# Patient Record
Sex: MALE | Race: WHITE | NOT HISPANIC OR LATINO | Employment: OTHER | URBAN - METROPOLITAN AREA
[De-identification: names, ages, dates, MRNs, and addresses within clinical notes are randomized per-mention and may not be internally consistent; named-entity substitution may affect disease eponyms.]

---

## 2017-06-28 ENCOUNTER — PROCEDURE VISIT (OUTPATIENT)
Dept: ALLERGY | Facility: CLINIC | Age: 67
End: 2017-06-28
Payer: MEDICARE

## 2017-06-28 VITALS
HEIGHT: 70 IN | BODY MASS INDEX: 22.9 KG/M2 | SYSTOLIC BLOOD PRESSURE: 110 MMHG | WEIGHT: 160 LBS | DIASTOLIC BLOOD PRESSURE: 68 MMHG

## 2017-06-28 DIAGNOSIS — T78.1XXD ADVERSE FOOD REACTION, SUBSEQUENT ENCOUNTER: Primary | ICD-10-CM

## 2017-06-28 DIAGNOSIS — F41.9 ANXIETY: ICD-10-CM

## 2017-06-28 DIAGNOSIS — T63.91XA VENOM-INDUCED ANAPHYLAXIS, ACCIDENTAL OR UNINTENTIONAL, INITIAL ENCOUNTER: ICD-10-CM

## 2017-06-28 PROBLEM — T78.1XXA ADVERSE FOOD REACTION: Status: ACTIVE | Noted: 2017-06-28

## 2017-06-28 PROCEDURE — 95004 PERQ TESTS W/ALRGNC XTRCS: CPT | Mod: ,,, | Performed by: ALLERGY & IMMUNOLOGY

## 2017-06-28 NOTE — PROCEDURES
Procedures     Pt presents for food reactions.     He mentions corn, wheat, beef, and wants to know if other foods contribute.     Discussed him to bring corn or cornstarch in clinic for a prick to prick and will have to test for corn, gluten, an venoms via serum IgE.     quintip device was used for food allergens made by [employer]. Allergens were placed on the volar surface of the forearm.  Adequate histamine and saline control was used with ld tip.  Pertinent positives: negative.     [facility] venom, corn, and gluten.

## 2017-06-29 LAB
DEPRECATED PAPER WASP IGE RAST QL: ABNORMAL
PAPER WASP IGE QN: 0.11 KU/L
REF LAB TEST REF RANGE: NORMAL

## 2017-06-30 LAB
CORN IGE QN: <0.1 KU/L
DEPRECATED CORN IGE RAST QL: 0
DEPRECATED GLUTEN IGE RAST QL: 0
DEPRECATED HONEY BEE IGE RAST QL: 2
DEPRECATED RED IMP FIRE ANT IGE RAST QL: 0
DEPRECATED WHITEFACED HORNET IGE RAST QL: 0
DEPRECATED YELLOW HORNET IGE RAST QL: 0
DEPRECATED YELLOW JACKET IGE RAST QL: 0
GLUTEN IGE QN: <0.1 KU/L
HONEY BEE IGE QN: 0.71 KU/L
RED IMP FIRE ANT IGE QN: <0.1 KU/L
REF LAB TEST REF RANGE: NORMAL
WHITEFACED HORNET IGE QN: <0.1 KU/L
YELLOW HORNET IGE QN: <0.1 KU/L
YELLOW JACKET IGE QN: <0.1 KU/L

## 2017-06-30 NOTE — PROGRESS NOTES
Will you call the patient and tell him that his honey bee lab shows sensitivity and his corn and gluten show negative tests. We are still waiting on wasp from [facility] to confirm allergy to wasp. I recommend an epi pen for emergencies if one is not already available to you.

## 2017-07-03 ENCOUNTER — TELEPHONE (OUTPATIENT)
Dept: PULMONOLOGY | Facility: CLINIC | Age: 67
End: 2017-07-03

## 2017-07-03 DIAGNOSIS — T63.91XA ALLERGY OR TOXIC REACTION TO VENOM, ACCIDENTAL OR UNINTENTIONAL, INITIAL ENCOUNTER: Primary | ICD-10-CM

## 2017-07-03 RX ORDER — EPINEPHRINE 0.3 MG/.3ML
1 INJECTION SUBCUTANEOUS ONCE
Qty: 0.3 ML | Refills: 3 | Status: SHIPPED | OUTPATIENT
Start: 2017-07-03 | End: 2017-07-20

## 2017-07-05 NOTE — TELEPHONE ENCOUNTER
You can tell him I am uncertain as to why he has this reaction when he eats it. It is not a reaction that is widely studied in corn starch or corn products. For now, he may need to avoid it to help his symptoms.

## 2017-07-20 ENCOUNTER — OFFICE VISIT (OUTPATIENT)
Dept: ALLERGY | Facility: CLINIC | Age: 67
End: 2017-07-20
Payer: MEDICARE

## 2017-07-20 ENCOUNTER — LAB VISIT (OUTPATIENT)
Dept: LAB | Facility: HOSPITAL | Age: 67
End: 2017-07-20
Payer: MEDICARE

## 2017-07-20 VITALS
BODY MASS INDEX: 22.56 KG/M2 | WEIGHT: 161.19 LBS | HEIGHT: 71 IN | DIASTOLIC BLOOD PRESSURE: 60 MMHG | SYSTOLIC BLOOD PRESSURE: 110 MMHG | HEART RATE: 56 BPM

## 2017-07-20 DIAGNOSIS — J31.0 CHRONIC RHINITIS, UNSPECIFIED TYPE: Primary | ICD-10-CM

## 2017-07-20 DIAGNOSIS — J31.0 CHRONIC RHINITIS, UNSPECIFIED TYPE: ICD-10-CM

## 2017-07-20 DIAGNOSIS — Z91.018 FOOD ALLERGY: ICD-10-CM

## 2017-07-20 LAB — IGA SERPL-MCNC: 123 MG/DL

## 2017-07-20 PROCEDURE — 86003 ALLG SPEC IGE CRUDE XTRC EA: CPT | Mod: 59

## 2017-07-20 PROCEDURE — 82784 ASSAY IGA/IGD/IGG/IGM EACH: CPT

## 2017-07-20 PROCEDURE — 86003 ALLG SPEC IGE CRUDE XTRC EA: CPT

## 2017-07-20 PROCEDURE — 86255 FLUORESCENT ANTIBODY SCREEN: CPT

## 2017-07-20 PROCEDURE — 99204 OFFICE O/P NEW MOD 45 MIN: CPT | Mod: S$GLB,,, | Performed by: ALLERGY & IMMUNOLOGY

## 2017-07-20 PROCEDURE — 99999 PR PBB SHADOW E&M-NEW PATIENT-LVL III: CPT | Mod: PBBFAC,,, | Performed by: ALLERGY & IMMUNOLOGY

## 2017-07-20 PROCEDURE — 83516 IMMUNOASSAY NONANTIBODY: CPT
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
## 2017-07-24 LAB
ALLERGEN RYE IGE: <0.35 KU/L
ALLERGEN WHEAT IGE: <0.35 KU/L
CORN IGE QN: <0.35 KU/L
DEPRECATED CORN IGE RAST QL: NORMAL
DEPRECATED GLUTEN IGE RAST QL: NORMAL
DEPRECATED OAT IGE RAST QL: NORMAL
DEPRECATED RICE IGE RAST QL: NORMAL
DEPRECATED SOYBEAN IGE RAST QL: NORMAL
GLUTEN IGE QN: <0.35 KU/L
OAT IGE QN: <0.35 KU/L
RICE IGE QN: <0.35 KU/L
RYE CLASS: NORMAL
SOYBEAN IGE QN: <0.35 KU/L
TTG IGA SER IA-ACNC: 3 UNITS
WHEAT CLASS: NORMAL

## 2017-07-27 LAB
ENDOMYSIAL (EMA) ANTIBODY IGG TITER: NORMAL
ENDOMYSIAL (EMA) ANTIBODY IGG: NORMAL TITER

## 2017-08-02 ENCOUNTER — TELEPHONE (OUTPATIENT)
Dept: ALLERGY | Facility: CLINIC | Age: 67
End: 2017-08-02

## 2017-08-02 NOTE — TELEPHONE ENCOUNTER
"Spoke to pt, told him tests were all negative. Pt states he ate some malted barley and also had similar symptoms. I explained that he may have an intolerance. Pt said Dr Snyder also told him something to that effect.  Pt would like to have test for barley just so he "knows".     Told pt I would let Dr Snyder know that we spoke and what his concerns are.   "

## 2017-08-21 ENCOUNTER — TELEPHONE (OUTPATIENT)
Dept: ALLERGY | Facility: CLINIC | Age: 67
End: 2017-08-21

## 2017-08-21 NOTE — TELEPHONE ENCOUNTER
Called to inform them that we have not received a Release of Info for this patient.  They will send it again.  Verified fax number, which was correct.  Will send to my attention.

## 2017-08-21 NOTE — TELEPHONE ENCOUNTER
----- Message from Yanely Morris MA sent at 8/15/2017  2:33 PM CDT -----  Contact: [facility]vino[facility] - [phone] fax # [phone]  Requesting Office Visit Notes and Allergy Test Results from 7/20/17. Will fax release to office. Please call. Thanks!

## 2018-10-10 ENCOUNTER — OFFICE VISIT (OUTPATIENT)
Dept: ORTHOPEDICS | Facility: CLINIC | Age: 68
End: 2018-10-10
Payer: MEDICARE

## 2018-10-10 VITALS — HEIGHT: 71 IN | BODY MASS INDEX: 24.45 KG/M2 | WEIGHT: 174.63 LBS

## 2018-10-10 DIAGNOSIS — M19.171 POST-TRAUMATIC ARTHRITIS OF ANKLE, RIGHT: Primary | ICD-10-CM

## 2018-10-10 PROCEDURE — 1101F PT FALLS ASSESS-DOCD LE1/YR: CPT | Mod: ,,, | Performed by: ORTHOPAEDIC SURGERY

## 2018-10-10 PROCEDURE — 73610 X-RAY EXAM OF ANKLE: CPT | Mod: FY,RT,, | Performed by: ORTHOPAEDIC SURGERY

## 2018-10-10 PROCEDURE — 73630 X-RAY EXAM OF FOOT: CPT | Mod: FY,RT,, | Performed by: ORTHOPAEDIC SURGERY

## 2018-10-10 PROCEDURE — 99204 OFFICE O/P NEW MOD 45 MIN: CPT | Mod: ,,, | Performed by: ORTHOPAEDIC SURGERY

## 2018-10-10 NOTE — PROGRESS NOTES
Subjective:       Chief Complaint    Chief Complaint   Patient presents with    Right Ankle - Pain     Pt reports today for Rt ankle pain x 1 month. Rt leg 2 inches short. Pt states that he was resting for 2-3 months recovering from back pain. He states that he started moving around and startes having ankle pain. He states that he feels soreness in Rt leg       HPI  Harjinder Fairchild is a 68 y.o.  male who presents follow-up on posttraumatic arthritis foot and ankle, right. Recently started having problems that have been improving.      Past History  Past Medical History:   Diagnosis Date    Adverse food reaction     Allergy     Anxiety     Back pain      Past Surgical History:   Procedure Laterality Date    FRACTURE SURGERY      right foot    FRACTURE SURGERY  1986    Jaw, Nose    INJECTION-STEROID-EPIDURAL-TRANSFORAMINAL Left 4/17/2014    [facility]    INJECTION-STEROID-EPIDURAL-TRANSFORAMINAL Left 3/27/2014    [facility]     Social History     Socioeconomic History    Marital status:      Spouse name: Not on file    Number of children: Not on file    Years of education: Not on file    Highest education level: Not on file   Social Needs    Financial resource strain: Not on file    Food insecurity - worry: Not on file    Food insecurity - inability: Not on file    Transportation needs - medical: Not on file    Transportation needs - non-medical: Not on file   Occupational History    Not on file   Tobacco Use    Smoking status: Never Smoker    Smokeless tobacco: Never Used   Substance and Sexual Activity    Alcohol use: Yes     Comment: socially maybe 2 on weekend.    Drug use: No    Sexual activity: Not on file   Other Topics Concern    Not on file   Social History Narrative    Not on file         Medications  Current Outpatient Medications   Medication Sig    ibuprofen (ADVIL,MOTRIN) 200 MG tablet Take 600 mg by  mouth every 6 (six) hours as needed.       No current facility-administered medications for this visit.      Facility-Administered Medications Ordered in Other Visits   Medication    0.9%  NaCl infusion    betamethasone acetate-betamethasone sodium phosphate injection    bupivacaine (PF) 0.25% (2.5 mg/ml) injection    gadodiamide Soln       Allergies  Review of patient's allergies indicates:   Allergen Reactions    Shellfish containing products Other (See Comments)     Anxiety, headache, itching.  Topical Betadine is OK.    Iodine containing multivitamin Itching    Unable to assess      Msg, perservatives.         Review of Systems     Constitutional: Negative    HENT: Negative  Eyes: Negative  Respiratory: Negative  Cardiovascular: Negative  Musculoskeletal: HPI  Skin: Negative  Neurological: Negative  Hematological: Negative  Endocrine: Negative      Physical Exam    There were no vitals filed for this visit.  Physical Examination: A jog of motion is present in the ankle region with some tenderness at the site of motion. Foot is moderately pronated. There is no inversion, eversion at the hindfoot. He has a good dorsalis pedis. He can dorsiflex to neutral and plantar flexion may be 20°.     Skin-clear  General appearance -  well appearing, and in no distress  Mental status - awake  Neck - supple  Chest -  symmetric air entry  Heart - normal rate   Abdomen - soft      Assessment/Plan   Post-traumatic arthritis of ankle, right  -     X-Ray Ankle Complete Right  -     X-Ray Foot Complete Right      . He does have limited motion in the ankle joint. Getting impingement in dorsiflexion, which is very limited.      This note was dictated using voice recognition software and may contain grammatical errors.

## 2018-12-11 ENCOUNTER — OFFICE VISIT (OUTPATIENT)
Dept: ORTHOPEDICS | Facility: CLINIC | Age: 68
End: 2018-12-11
Payer: MEDICARE

## 2018-12-11 VITALS
HEIGHT: 71 IN | SYSTOLIC BLOOD PRESSURE: 122 MMHG | HEART RATE: 59 BPM | BODY MASS INDEX: 24.36 KG/M2 | WEIGHT: 174 LBS | DIASTOLIC BLOOD PRESSURE: 59 MMHG

## 2018-12-11 DIAGNOSIS — M54.16 LUMBAR RADICULITIS: ICD-10-CM

## 2018-12-11 DIAGNOSIS — M19.171 POST-TRAUMATIC ARTHRITIS OF ANKLE, RIGHT: Primary | ICD-10-CM

## 2018-12-11 PROCEDURE — 73610 X-RAY EXAM OF ANKLE: CPT | Mod: RT,,, | Performed by: ORTHOPAEDIC SURGERY

## 2018-12-11 PROCEDURE — 72100 X-RAY EXAM L-S SPINE 2/3 VWS: CPT | Mod: ,,, | Performed by: ORTHOPAEDIC SURGERY

## 2018-12-11 PROCEDURE — 99213 OFFICE O/P EST LOW 20 MIN: CPT | Mod: 25,57,, | Performed by: ORTHOPAEDIC SURGERY

## 2018-12-11 PROCEDURE — 1101F PT FALLS ASSESS-DOCD LE1/YR: CPT | Mod: ,,, | Performed by: ORTHOPAEDIC SURGERY

## 2018-12-11 RX ORDER — IBUPROFEN 200 MG
TABLET ORAL EVERY 6 HOURS PRN
COMMUNITY
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
## 2018-12-12 DIAGNOSIS — M25.571 RIGHT ANKLE PAIN, UNSPECIFIED CHRONICITY: Primary | ICD-10-CM

## 2018-12-13 ENCOUNTER — OFFICE VISIT (OUTPATIENT)
Dept: ORTHOPEDICS | Facility: CLINIC | Age: 68
End: 2018-12-13
Payer: MEDICARE

## 2018-12-13 ENCOUNTER — HOSPITAL ENCOUNTER (OUTPATIENT)
Dept: RADIOLOGY | Facility: HOSPITAL | Age: 68
Discharge: HOME | End: 2018-12-13
Attending: ORTHOPAEDIC SURGERY
Payer: MEDICARE

## 2018-12-13 VITALS
BODY MASS INDEX: 24.35 KG/M2 | SYSTOLIC BLOOD PRESSURE: 127 MMHG | WEIGHT: 173.94 LBS | DIASTOLIC BLOOD PRESSURE: 70 MMHG | HEIGHT: 71 IN | HEART RATE: 67 BPM

## 2018-12-13 DIAGNOSIS — M19.071 DEGENERATIVE JOINT DISEASE OF RIGHT HINDFOOT: ICD-10-CM

## 2018-12-13 DIAGNOSIS — M25.571 RIGHT ANKLE PAIN, UNSPECIFIED CHRONICITY: ICD-10-CM

## 2018-12-13 DIAGNOSIS — M19.171 POST-TRAUMATIC ARTHRITIS OF ANKLE, RIGHT: Primary | ICD-10-CM

## 2018-12-13 PROCEDURE — 99999 PR PBB SHADOW E&M-EST. PATIENT-LVL III: CPT | Mod: PBBFAC,,, | Performed by: ORTHOPAEDIC SURGERY

## 2018-12-13 PROCEDURE — 73610 X-RAY EXAM OF ANKLE: CPT | Mod: 26,RT,, | Performed by: RADIOLOGY

## 2018-12-13 PROCEDURE — 1101F PT FALLS ASSESS-DOCD LE1/YR: CPT | Mod: CPTII,S$GLB,, | Performed by: ORTHOPAEDIC SURGERY

## 2018-12-13 PROCEDURE — 99204 OFFICE O/P NEW MOD 45 MIN: CPT | Mod: S$GLB,,, | Performed by: ORTHOPAEDIC SURGERY

## 2018-12-13 PROCEDURE — 73610 X-RAY EXAM OF ANKLE: CPT | Mod: TC,PO,RT

## 2018-12-13 NOTE — LETTER
December 17, 2018      Justin Soriano MD  [address]  [place] [address]           [facility]  [address] [facility] [address] [place] [address]  Phone: [phone]          Patient: Harjinder Fairchild   MR Number: [id number]   YOB: 1950   Date of Visit: 12/13/2018       Dear Dr. Justin Soriano:    Thank you for referring Harjinder Fairchild to me for evaluation. Attached you will find relevant portions of my assessment and plan of care.    If you have questions, please do not hesitate to call me. I look forward to following Harjinder Fairchild along with you.    Sincerely,    Wyatt Amezquita MD    Enclosure  CC:  No Recipients    If you would like to receive this communication electronically, please contact [email or web address] or [phone] to request more information on [employer] Link access.    For providers and/or their staff who would like to refer a patient to [facility], please contact us through our one-stop-shop provider referral line, Ludwin Sethi, at [phone].    If you feel you have received this communication in error or would no longer like to receive these types of communications, please e-mail [email or web address]

## 2018-12-17 PROBLEM — M19.071 DEGENERATIVE JOINT DISEASE OF RIGHT HINDFOOT: Status: ACTIVE | Noted: 2018-12-17
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
## 2020-01-14 ENCOUNTER — OFFICE VISIT (OUTPATIENT)
Dept: GASTROENTEROLOGY | Facility: CLINIC | Age: 70
End: 2020-01-14
Payer: MEDICARE

## 2020-01-14 VITALS
OXYGEN SATURATION: 97 % | DIASTOLIC BLOOD PRESSURE: 65 MMHG | WEIGHT: 182 LBS | SYSTOLIC BLOOD PRESSURE: 120 MMHG | HEIGHT: 71 IN | BODY MASS INDEX: 25.48 KG/M2 | TEMPERATURE: 98 F | HEART RATE: 82 BPM

## 2020-01-14 DIAGNOSIS — Z92.89 HISTORY OF BLOOD TRANSFUSION: ICD-10-CM

## 2020-01-14 DIAGNOSIS — F41.9 ANXIETY: ICD-10-CM

## 2020-01-14 DIAGNOSIS — Z86.010 HX OF COLONIC POLYPS: ICD-10-CM

## 2020-01-14 DIAGNOSIS — Z12.11 SCREENING FOR MALIGNANT NEOPLASM OF COLON: Primary | ICD-10-CM

## 2020-01-14 DIAGNOSIS — M19.171 POST-TRAUMATIC ARTHRITIS OF ANKLE, RIGHT: ICD-10-CM

## 2020-01-14 DIAGNOSIS — Z86.010 HISTORY OF COLON POLYPS: ICD-10-CM

## 2020-01-14 DIAGNOSIS — K59.00 CONSTIPATION, UNSPECIFIED CONSTIPATION TYPE: Primary | ICD-10-CM

## 2020-01-14 DIAGNOSIS — Z80.42 FHX: PROSTATE CANCER: ICD-10-CM

## 2020-01-14 PROCEDURE — 1125F PR PAIN SEVERITY QUANTIFIED, PAIN PRESENT: ICD-10-PCS | Mod: S$GLB,,, | Performed by: INTERNAL MEDICINE

## 2020-01-14 PROCEDURE — 99214 PR OFFICE/OUTPT VISIT, EST, LEVL IV, 30-39 MIN: ICD-10-PCS | Mod: S$GLB,,, | Performed by: INTERNAL MEDICINE

## 2020-01-14 PROCEDURE — 99214 OFFICE O/P EST MOD 30 MIN: CPT | Mod: S$GLB,,, | Performed by: INTERNAL MEDICINE

## 2020-01-14 PROCEDURE — 1125F AMNT PAIN NOTED PAIN PRSNT: CPT | Mod: S$GLB,,, | Performed by: INTERNAL MEDICINE

## 2020-01-14 PROCEDURE — 1159F MED LIST DOCD IN RCRD: CPT | Mod: S$GLB,,, | Performed by: INTERNAL MEDICINE

## 2020-01-14 PROCEDURE — 1159F PR MEDICATION LIST DOCUMENTED IN MEDICAL RECORD: ICD-10-PCS | Mod: S$GLB,,, | Performed by: INTERNAL MEDICINE

## 2020-01-14 RX ORDER — POLYETHYLENE GLYCOL 3350, SODIUM SULFATE ANHYDROUS, SODIUM BICARBONATE, SODIUM CHLORIDE, POTASSIUM CHLORIDE 236; 22.74; 6.74; 5.86; 2.97 G/4L; G/4L; G/4L; G/4L; G/4L
4 POWDER, FOR SOLUTION ORAL ONCE
Qty: 1 BOTTLE | Refills: 0 | Status: SHIPPED | OUTPATIENT
Start: 2020-01-14 | End: 2020-01-14

## 2020-01-14 NOTE — PROGRESS NOTES
[facility] Established Patient Visit    Subjective:           PCP: Becka Lopez    Chief Complaint: Colonoscopy       HPI:  Harjinder Fairchild is a 69 y.o. male here for a 5 yr evaluation of polyps of the colon. Patient has had multiple polyps in the past. Last colonoscopy was /25/2017. Patient's father had prostate cancer. Patient denies any history of dysphagia, odynophagia, early satiety, hematemesis, fever, chills, or rigors. Patient has history of multiple blood transfusions and need to check hepatitis profile. The patient has a prostate problems. There are no significant upper GI symptoms. Patient has had adenomatous polyps in the past. Patient does have constipation unless he takes MiraLax.     ROS:   Constitutional: No fevers, chills, weight loss  ENT: No allergies, sore throat, rhinorrhea  CV: No chest pain, palpitations, edema  Pulm: No cough, shortness of breath, wheezing  Ophtho: No vision changes  GI: No blood in stools, change in bowel habits, nausea/vomiting, history colon polyps, constipation  Denies alternating diarrhea.   Derm: No rash  Heme: No easy bruising or lymphadenopathy  MSK: No arthralgias or myalgias  : No dysuria, hematuria, frequency, polyuria, or flank tenderness  Endo: No hot or cold intolerance  Neuro: No syncope or seizure, or dizziness  Psych: No hallucination, depression or suicidal ideation      Medical History:  has a past medical history of Adverse food reaction, Allergy, Anxiety, and Back pain.      Surgical History:  has a past surgical history that includes Fracture surgery and Fracture surgery (1986).    Family History:   Family History   Problem Relation Age of Onset    Food intolerance Mother     Asthma Neg Hx     Atopy Neg Hx     Eczema Neg Hx     Immunodeficiency Neg Hx        Social History:   Social History     Tobacco Use    Smoking status: Never Smoker    Smokeless tobacco: Never Used   Substance Use Topics    Alcohol use: Yes      "Comment: socially maybe 2 on weekend.    Drug use: No       The patient's social and family histories were reviewed by me and updated in the appropriate section of the electronic medical record.    Allergies:   Review of patient's allergies indicates:   Allergen Reactions    Shellfish containing products Other (See Comments), Anxiety and Rash     Anxiety, headache, itching.  Topical Betadine is OK.  Anxiety, headache, itching.  Topical Betadine is OK.    Corn starch      States can only take capsules due to the corn starch in tablets     Iodine containing multivitamin Itching    Unable to assess      Msg, perservatives.    Corn syrup Anxiety         Medications:   Current Outpatient Medications   Medication Sig Dispense Refill    ibuprofen (ADVIL,MOTRIN) 200 MG tablet Take by mouth every 6 (six) hours as needed.       No current facility-administered medications for this visit.      Facility-Administered Medications Ordered in Other Visits   Medication Dose Route Frequency Provider Last Rate Last Dose    0.9%  NaCl infusion    PRN Nicola Denis MD   4 mL at 04/17/14 0945    betamethasone acetate-betamethasone sodium phosphate injection    PRN Nicola Denis MD   6 mg at 04/17/14 0946    bupivacaine (PF) 0.25% (2.5 mg/ml) injection    PRN Nicola Denis MD   1 mL at 04/17/14 0946    gadodiamide Soln    PRN Nicola Denis MD   3 mL at 04/17/14 0945     All medications and past history have been reviewed.        Objective:        Vital Signs:  Blood pressure 120/65, pulse 82, temperature 97.6 °F (36.4 °C), height 5' 10.5" (1.791 m), weight 82.6 kg (182 lb), SpO2 97 %. Body mass index is 25.75 kg/m².    Physical Exam:   General Appearance: Well appearing in no acute distress, well developed, well  nourished  Head: Normocephalic, without obvious abnormality  Eyes:  Pupils equal, round and reactive to light  Throat: Lips, mucosa, and tongue normal; teeth and gums normal  Lungs: CTA " bilaterally in anterior and posterior fields, no wheezes, no crackles  Heart:  Regular rate and rhythm, no murmurs heard  Abdomen: Soft, non tender, non distended with positive bowel sounds in all four quadrants. No hepatosplenomegaly, ascites, or mass. Negative for succusion splash  : male   Extremities: No cyanosis, edema or deformity  Skin: No rash  Neurologic: Normal exam    Labs:  No results found for: WBC, HGB, HCT, PLT, CHOL, TRIG, HDL, LDLDIRECT, ALT, AST, NA, K, CL, CREATININE, BUN, CO2, TSH, PSA, INR, GLUF, HGBA1C, MICROALBUR    Imaging:     All lab results and imaging results have been reviewed and discussed with the patient      Assessment:       1. Constipation, unspecified constipation type    2. Hx of colonic polyps    3. FHx: prostate cancer    4. History of blood transfusion    5. Anxiety    6. Post-traumatic arthritis of ankle, right        Plan:       Harjinder was seen today for colonoscopy.    Diagnoses and all orders for this visit:    Constipation, unspecified constipation type    Hx of colonic polyps    FHx: prostate cancer    History of blood transfusion  -     Amylase; Future  -     Lipase; Future  -     Hepatitis A antibody, total; Future  -     Hepatitis B core antibody, total; Future  -     Hepatitis B Surface Antibody, Qual/Quant; Future  -     Hepatitis B surface antigen; Future  -     Hepatitis C RNA, quantitative, PCR; Future  -     US Abdomen Complete; Future  -     Hepatitis B Surface Antibody, Qual/Quant    Anxiety    Post-traumatic arthritis of ankle, right        See HPI    No follow-ups on file.    The plan was discussed with the patient and all questions/concerns have been answered to the patient's satisfaction.        Electronically signed by Christina Levin MD    This note was dictated using voice recognition software and may contain grammatical errors.

## 2020-01-16 ENCOUNTER — HOSPITAL ENCOUNTER (OUTPATIENT)
Dept: RADIOLOGY | Facility: HOSPITAL | Age: 70
Discharge: HOME | End: 2020-01-16
Attending: INTERNAL MEDICINE
Payer: MEDICARE

## 2020-01-16 DIAGNOSIS — Z92.89 HISTORY OF BLOOD TRANSFUSION: ICD-10-CM

## 2020-01-16 PROCEDURE — 76700 US EXAM ABDOM COMPLETE: CPT | Mod: TC,PO

## 2020-01-29 ENCOUNTER — HOSPITAL ENCOUNTER (OUTPATIENT)
Facility: HOSPITAL | Age: 70
Discharge: HOME | End: 2020-01-29
Attending: INTERNAL MEDICINE | Admitting: INTERNAL MEDICINE
Payer: MEDICARE

## 2020-01-29 VITALS
OXYGEN SATURATION: 96 % | BODY MASS INDEX: 23.8 KG/M2 | WEIGHT: 170 LBS | RESPIRATION RATE: 16 BRPM | HEIGHT: 71 IN | HEART RATE: 55 BPM | DIASTOLIC BLOOD PRESSURE: 62 MMHG | SYSTOLIC BLOOD PRESSURE: 123 MMHG | TEMPERATURE: 99 F

## 2020-01-29 DIAGNOSIS — Z86.010 HISTORY OF COLON POLYPS: ICD-10-CM

## 2020-01-29 PROCEDURE — 45380 COLONOSCOPY AND BIOPSY: CPT | Performed by: INTERNAL MEDICINE

## 2020-01-29 PROCEDURE — 27200043 HC FORCEPS, BIOPSY: Performed by: INTERNAL MEDICINE

## 2020-01-29 PROCEDURE — 45380 COLONOSCOPY AND BIOPSY: CPT | Mod: 33,,, | Performed by: INTERNAL MEDICINE

## 2020-01-29 PROCEDURE — 88305 TISSUE EXAM BY PATHOLOGIST: CPT | Mod: TC

## 2020-01-29 PROCEDURE — 99153 MOD SED SAME PHYS/QHP EA: CPT | Performed by: INTERNAL MEDICINE

## 2020-01-29 PROCEDURE — 99152 MOD SED SAME PHYS/QHP 5/>YRS: CPT | Mod: 59 | Performed by: INTERNAL MEDICINE

## 2020-01-29 PROCEDURE — 63600175 PHARM REV CODE 636 W HCPCS: Performed by: INTERNAL MEDICINE

## 2020-01-29 PROCEDURE — 45380 PR COLONOSCOPY,BIOPSY: ICD-10-PCS | Mod: 33,,, | Performed by: INTERNAL MEDICINE

## 2020-01-29 RX ORDER — MEPERIDINE HYDROCHLORIDE 100 MG/ML
INJECTION INTRAMUSCULAR; INTRAVENOUS; SUBCUTANEOUS
Status: COMPLETED | OUTPATIENT
Start: 2020-01-29 | End: 2020-01-29

## 2020-01-29 RX ORDER — SODIUM CHLORIDE 0.9 % (FLUSH) 0.9 %
2 SYRINGE (ML) INJECTION
Status: DISCONTINUED | OUTPATIENT
Start: 2020-01-29 | End: 2020-01-29 | Stop reason: HOSPADM

## 2020-01-29 RX ORDER — SODIUM CHLORIDE 9 MG/ML
INJECTION, SOLUTION INTRAVENOUS CONTINUOUS
Status: DISCONTINUED | OUTPATIENT
Start: 2020-01-29 | End: 2020-01-29 | Stop reason: HOSPADM

## 2020-01-29 RX ORDER — DIAZEPAM 10 MG/2ML
INJECTION INTRAMUSCULAR
Status: COMPLETED | OUTPATIENT
Start: 2020-01-29 | End: 2020-01-29

## 2020-01-29 RX ORDER — MIDAZOLAM HYDROCHLORIDE 1 MG/ML
INJECTION INTRAMUSCULAR; INTRAVENOUS
Status: COMPLETED | OUTPATIENT
Start: 2020-01-29 | End: 2020-01-29

## 2020-01-29 RX ADMIN — MIDAZOLAM HYDROCHLORIDE 1 MG: 1 INJECTION, SOLUTION INTRAMUSCULAR; INTRAVENOUS at 12:01

## 2020-01-29 RX ADMIN — Medication 25 MG: at 12:01

## 2020-01-29 RX ADMIN — DIAZEPAM 5 MG: 5 INJECTION, SOLUTION INTRAMUSCULAR; INTRAVENOUS at 12:01

## 2020-01-29 RX ADMIN — MIDAZOLAM HYDROCHLORIDE 2 MG: 1 INJECTION, SOLUTION INTRAMUSCULAR; INTRAVENOUS at 12:01

## 2020-01-29 RX ADMIN — Medication 50 MG: at 12:01

## 2020-01-29 NOTE — [employer] PATIENT INSTRUCTIONS
Discharge Summary/Instructions after an Endoscopic Procedure  Patient Name: Harjinder Fairchild  Patient MRN: [id number]  Patient YOB: 1950  Wednesday, January 29, 2020  Christina Levin MD  RESTRICTIONS:  During your procedure today, you received medications for sedation.  These   medications may affect your judgment, balance and coordination.  Therefore,   for 24 hours, you have the following restrictions:   - DO NOT drive a car, operate machinery, make legal/financial decisions,   sign important papers or drink alcohol.    ACTIVITY:  Today: no heavy lifting, straining or running due to procedural   sedation/anesthesia.  The following day: return to full activity including work.  DIET:  Eat and drink normally unless instructed otherwise.     TREATMENT FOR COMMON SIDE EFFECTS:  - Mild abdominal pain, nausea, belching, bloating or excessive gas:  rest,   eat lightly and use a heating pad.  - Sore Throat: treat with throat lozenges and/or gargle with warm salt   water.  - Because air was used during the procedure, expelling large amounts of air   from your rectum or belching is normal.  - If a bowel prep was taken, you may not have a bowel movement for 1-3 days.    This is normal.  SYMPTOMS TO WATCH FOR AND REPORT TO YOUR PHYSICIAN:  1. Abdominal pain or bloating, other than gas cramps.  2. Chest pain.  3. Back pain.  4. Signs of infection such as: chills or fever occurring within 24 hours   after the procedure.  5. Rectal bleeding, which would show as bright red, maroon, or black stools.   (A tablespoon of blood from the rectum is not serious, especially if   hemorrhoids are present.)  6. Vomiting.  7. Weakness or dizziness.  GO DIRECTLY TO THE NEAREST EMERGENCY ROOM IF YOU HAVE ANY OF THE FOLLOWING:      Difficulty breathing              Chills and/or fever over 101 F   Persistent vomiting and/or vomiting blood   Severe abdominal pain   Severe chest pain   Black, tarry stools   Bleeding- more than one  tablespoon   Any other symptom or condition that you feel may need urgent attention  Your doctor recommends these additional instructions:  If any biopsies were taken, your doctors clinic will contact you in 1 to 2   weeks with any results.  - Repeat colonoscopy for surveillance based on pathology results.   - Discharge patient to home (with escort).  For questions, problems or results please call your physician - Christina Levin MD at Work:  [phone].  [facility], EMERGENCY ROOM PHONE NUMBER: [phone]  IF A COMPLICATION OR EMERGENCY SITUATION ARISES AND YOU ARE UNABLE TO REACH   YOUR PHYSICIAN - GO DIRECTLY TO THE EMERGENCY ROOM.  Christina Levin MD  1/29/2020 4:42:03 PM  This report has been verified and signed electronically.  [employer]

## 2020-01-29 NOTE — H&P
[facility]  History & Physical - Short Stay    Subjective:      Chief Complaint/Reason for Admission: Colonoscopy    Active Hospital Problems    Diagnosis  POA    History of colon polyps [Z86.010]  Not Applicable      Resolved Hospital Problems   No resolved problems to display.       History of Present Illness:  Harjinder Fairchild is a 69 y.o. male here for a 3 yr evaluation of polyps of the colon. Patient has had multiple polyps in the past. Last colonoscopy was /25/2017. Patient's father had prostate cancer. Patient denies any history of dysphagia, odynophagia, early satiety, hematemesis, fever, chills, or rigors. Patient has history of multiple blood transfusions and need to check hepatitis profile. The patient has a prostate problems. There are no significant upper GI symptoms. Patient has had adenomatous polyps in the past. Patient does have constipation unless he takes MiraLax.    Current Facility-Administered Medications:     0.9%  NaCl infusion, , Intravenous, Continuous, Christina Levin MD    0.9%  NaCl infusion, , Intravenous, Continuous, Christina Levin MD    sodium chloride 0.9% flush 2 mL, 2 mL, Intravenous, PRN, Christina Levin MD    Facility-Administered Medications Ordered in Other Encounters:     0.9%  NaCl infusion, , , PRN, Nicola Denis MD, 4 mL at 04/17/14 0945    betamethasone acetate-betamethasone sodium phosphate injection, , , PRN, Nicola Denis MD, 6 mg at 04/17/14 0946    bupivacaine (PF) 0.25% (2.5 mg/ml) injection, , , PRN, Nicola Denis MD, 1 mL at 04/17/14 0946    gadodiamide Soln, , , PRN, Nicola Denis MD, 3 mL at 04/17/14 0945    PTA Medications   Medication Sig    ibuprofen (ADVIL,MOTRIN) 200 MG tablet Take by mouth every 6 (six) hours as needed.       Review of patient's allergies indicates:   Allergen Reactions    Shellfish containing products Other (See Comments), Anxiety and Rash     Anxiety, headache, itching.  Topical  Betadine is OK.  Anxiety, headache, itching.  Topical Betadine is OK.    Corn starch      States can only take capsules due to the corn starch in tablets     Iodine containing multivitamin Itching    Msg [glutamic acid hcl]     Sodium benzoate     Corn syrup Anxiety        Past Medical History:   Diagnosis Date    Adverse food reaction     Allergy     Anxiety     Back pain            OBJECTIVE:     Vital Signs (Most Recent):          Physical Exam:    General Appearance: Well appearing in no acute distress, well developed, well  nourished  Head: Normocephalic, without obvious abnormality  Eyes:  Pupils equal, round and reactive to light  Throat: Lips, mucosa, and tongue normal; teeth and gums normal  Lungs: CTA bilaterally in anterior and posterior fields, no wheezes, no crackles  Heart:  Regular rate and rhythm, no murmurs heard  Abdomen: Soft, non tender, non distended with positive bowel sounds in all four quadrants. No hepatosplenomegaly, ascites, or mass. Negative for succusion splash  : male   Extremities: No cyanosis, edema or deformity  Skin: No rash  Neurologic: Normal exam    ASSESSMENT/PLAN:     1. Constipation, unspecified constipation type    2. Hx of colonic polyps    3. FHx: prostate cancer    4. History of blood transfusion    5. Anxiety    6. Post-traumatic arthritis of ankle, right          Plan: Colonoscopy

## 2020-01-29 NOTE — DISCHARGE INSTRUCTIONS
Eating a High-Fiber Diet  Fiber is what gives strength and structure to plants. Most grains, beans, vegetables, and fruits contain fiber. Foods rich in fiber are often low in calories and fat, and they fill you up more. They may also reduce your risks for certain health problems. To find out the amount of fiber in canned, packaged, or frozen foods, read the Nutrition Facts label. It tells you how much fiber is in a serving.    Types of fiber and their benefits  There are two types of fiber: insoluble and soluble. They both aid digestion and help you maintain a healthy weight.  · Insoluble fiber. This is found in whole grains, cereals, certain fruits and vegetables such as apple skin, corn, and carrots. Insoluble fiber may prevent constipation and reduce the risk for certain types of cancer.  · Soluble fiber. This type of fiber is in oats, beans, and certain fruits and vegetables such as strawberries and peas. Soluble fiber can reduce cholesterol, which may help lower the risk for heart disease. It also helps control blood sugar levels.  Look for high-fiber foods  Try these foods to add fiber to your diet:  · Whole-grain breads and cereals. Try to eat 6 to 8 ounces a day. Include wheat and oat bran cereals, whole-wheat muffins or toast, and corn tortillas in your meals.  · Fruits. Try to eat 2 cups a day. Apples, oranges, strawberries, pears, and bananas are good sources. (Note: Fruit juice is low in fiber.)  · Vegetables. Try to eat at least 2.5 cups a day. Add asparagus, carrots, broccoli, peas, and corn to your meals.  · Beans. One cup of cooked lentils gives you over 15 grams of fiber. Try navy beans, lentils, and chickpeas.  · Seeds. A small handful of seeds gives you about 3 grams of fiber. Try sunflower seeds.  Keep track of your fiber  Keep track of how much fiber you eat. Start by reading food labels. Then eat a variety of foods high in fiber. As you begin to eat more fiber, ask your healthcare provider  how much water you should be drinking to keep your digestive system working smoothly.  You should aim for a certain amount of fiber in your diet each day. If you are a woman, that amount is between 25 and 28 grams per day. Men should aim for 30 to 33 grams per day. After age 50, your daily fiber needs drop to 22 grams for women and 28 grams for men.  Before you reach for the fiber supplements, think about this. Fiber is found naturally in healthy whole foods. It gives you that feeling of fullness after you eat. Taking fiber supplements or eating fiber-enriched foods will not give you this full feeling.  Your fiber intake is a good measure for the quality of your overall diet. If you are missing out on your daily amount of fiber, you may be lacking other important nutrients as well.  Date Last Reviewed: 5/11/2015 © 2000-2017 [employer]. [address] [place] [address]. All rights reserved. This information is not intended as a substitute for professional medical care. Always follow your healthcare professional's instructions.        Understanding Colon and Rectal Polyps    The colon (also called the large intestine) is a muscular tube that forms the last part of the digestive tract. It absorbs water and stores food waste. The colon is about 4 to 6 feet long. The rectum is the last 6 inches of the colon. The colon and rectum have a smooth lining composed of millions of cells. Changes in these cells can lead to growths in the colon that can become cancerous and should be removed. Multiple tests are available to screen for colon cancer, but the colonoscopy is the most recommended test. During colonoscopy, these polyps can be removed. How often you need this test depends on many things including your condition, your family history, symptoms, and what the findings were at the previous colonoscopy.   When the colon lining changes  Changes that happen in the cells that line the colon or rectum can  lead to growths called polyps. Over a period of years, polyps can turn cancerous. Removing polyps early may prevent cancer from ever forming.  Polyps  Polyps are fleshy clumps of tissue that form on the lining of the colon or rectum. Small polyps are usually benign (not cancerous). However, over time, cells in a polyp can change and become cancerous. Certain types of polyps known as adenomatous polyps are premalignant. The risk for invasive cancer increases with the size of the polyp and certain cell and gene features. This means that they can become cancerous if they're not removed. Hyperplastic polyps are benign. They can grow quite large and not turn cancerous.   Cancer  Almost all colorectal cancers start when polyp cells begin growing abnormally. As a cancerous tumor grows, it may involve more and more of the colon or rectum. In time, cancer can also grow beyond the colon or rectum and spread to nearby organs or to glands called lymph nodes. The cells can also travel to other parts of the body. This is known as metastasis. The earlier a cancerous tumor is removed, the better the chance of preventing its spread.    Date Last Reviewed: 8/1/2016  © 1824-7296 "[employer]. [address] [place] [id number]. All rights reserved. This information is not intended as a substitute for professional medical care. Always follow your healthcare professional's instructions.    No Asprin products for 2 weeks